# Patient Record
Sex: FEMALE | Race: WHITE
[De-identification: names, ages, dates, MRNs, and addresses within clinical notes are randomized per-mention and may not be internally consistent; named-entity substitution may affect disease eponyms.]

---

## 2020-01-01 ENCOUNTER — RESULT REVIEW (OUTPATIENT)
Age: 85
End: 2020-01-01

## 2020-01-01 ENCOUNTER — APPOINTMENT (OUTPATIENT)
Dept: RADIATION ONCOLOGY | Facility: CLINIC | Age: 85
End: 2020-01-01
Payer: MEDICARE

## 2020-01-01 ENCOUNTER — APPOINTMENT (OUTPATIENT)
Dept: HEMATOLOGY ONCOLOGY | Facility: CLINIC | Age: 85
End: 2020-01-01

## 2020-01-01 ENCOUNTER — APPOINTMENT (OUTPATIENT)
Dept: GASTROENTEROLOGY | Facility: HOSPITAL | Age: 85
End: 2020-01-01

## 2020-01-01 ENCOUNTER — APPOINTMENT (OUTPATIENT)
Dept: HEMATOLOGY ONCOLOGY | Facility: CLINIC | Age: 85
End: 2020-01-01
Payer: MEDICARE

## 2020-01-01 ENCOUNTER — APPOINTMENT (OUTPATIENT)
Dept: GASTROENTEROLOGY | Facility: CLINIC | Age: 85
End: 2020-01-01
Payer: MEDICARE

## 2020-01-01 VITALS
RESPIRATION RATE: 15 BRPM | DIASTOLIC BLOOD PRESSURE: 57 MMHG | TEMPERATURE: 97 F | WEIGHT: 89 LBS | HEART RATE: 80 BPM | SYSTOLIC BLOOD PRESSURE: 115 MMHG | OXYGEN SATURATION: 96 % | HEIGHT: 59 IN | BODY MASS INDEX: 17.94 KG/M2

## 2020-01-01 VITALS
HEART RATE: 80 BPM | BODY MASS INDEX: 18.83 KG/M2 | SYSTOLIC BLOOD PRESSURE: 120 MMHG | DIASTOLIC BLOOD PRESSURE: 59 MMHG | WEIGHT: 93.38 LBS | TEMPERATURE: 97.3 F | RESPIRATION RATE: 18 BRPM | HEIGHT: 59 IN | OXYGEN SATURATION: 96 %

## 2020-01-01 VITALS
HEART RATE: 80 BPM | OXYGEN SATURATION: 97 % | DIASTOLIC BLOOD PRESSURE: 76 MMHG | RESPIRATION RATE: 14 BRPM | TEMPERATURE: 97 F | SYSTOLIC BLOOD PRESSURE: 136 MMHG | HEIGHT: 59 IN | BODY MASS INDEX: 18.35 KG/M2 | WEIGHT: 91 LBS

## 2020-01-01 VITALS
HEART RATE: 80 BPM | TEMPERATURE: 97.4 F | OXYGEN SATURATION: 95 % | BODY MASS INDEX: 16.93 KG/M2 | DIASTOLIC BLOOD PRESSURE: 82 MMHG | WEIGHT: 84 LBS | HEIGHT: 59 IN | RESPIRATION RATE: 18 BRPM | SYSTOLIC BLOOD PRESSURE: 142 MMHG

## 2020-01-01 VITALS
WEIGHT: 92.19 LBS | HEIGHT: 59 IN | BODY MASS INDEX: 18.59 KG/M2 | SYSTOLIC BLOOD PRESSURE: 116 MMHG | TEMPERATURE: 97.4 F | OXYGEN SATURATION: 96 % | RESPIRATION RATE: 18 BRPM | DIASTOLIC BLOOD PRESSURE: 68 MMHG | HEART RATE: 80 BPM

## 2020-01-01 VITALS
HEART RATE: 62 BPM | WEIGHT: 84 LBS | DIASTOLIC BLOOD PRESSURE: 78 MMHG | SYSTOLIC BLOOD PRESSURE: 110 MMHG | OXYGEN SATURATION: 96 % | TEMPERATURE: 96.3 F | HEIGHT: 59 IN | BODY MASS INDEX: 16.93 KG/M2

## 2020-01-01 VITALS
BODY MASS INDEX: 17.94 KG/M2 | SYSTOLIC BLOOD PRESSURE: 124 MMHG | TEMPERATURE: 98 F | DIASTOLIC BLOOD PRESSURE: 74 MMHG | HEIGHT: 59 IN | WEIGHT: 89 LBS | OXYGEN SATURATION: 98 % | RESPIRATION RATE: 12 BRPM | HEART RATE: 78 BPM

## 2020-01-01 DIAGNOSIS — E86.0 DEHYDRATION: ICD-10-CM

## 2020-01-01 DIAGNOSIS — Z87.891 PERSONAL HISTORY OF NICOTINE DEPENDENCE: ICD-10-CM

## 2020-01-01 DIAGNOSIS — Z82.49 FAMILY HISTORY OF ISCHEMIC HEART DISEASE AND OTHER DISEASES OF THE CIRCULATORY SYSTEM: ICD-10-CM

## 2020-01-01 DIAGNOSIS — N28.9 DISORDER OF KIDNEY AND URETER, UNSPECIFIED: ICD-10-CM

## 2020-01-01 DIAGNOSIS — R13.10 DYSPHAGIA, UNSPECIFIED: ICD-10-CM

## 2020-01-01 DIAGNOSIS — Z87.448 PERSONAL HISTORY OF OTHER DISEASES OF URINARY SYSTEM: ICD-10-CM

## 2020-01-01 DIAGNOSIS — Z95.0 PRESENCE OF CARDIAC PACEMAKER: ICD-10-CM

## 2020-01-01 DIAGNOSIS — R63.4 ABNORMAL WEIGHT LOSS: ICD-10-CM

## 2020-01-01 DIAGNOSIS — Z78.9 OTHER SPECIFIED HEALTH STATUS: ICD-10-CM

## 2020-01-01 DIAGNOSIS — Z85.820 PERSONAL HISTORY OF MALIGNANT MELANOMA OF SKIN: ICD-10-CM

## 2020-01-01 DIAGNOSIS — C15.9 MALIGNANT NEOPLASM OF ESOPHAGUS, UNSPECIFIED: ICD-10-CM

## 2020-01-01 DIAGNOSIS — Z87.42 PERSONAL HISTORY OF OTHER DISEASES OF THE FEMALE GENITAL TRACT: ICD-10-CM

## 2020-01-01 DIAGNOSIS — Z86.39 PERSONAL HISTORY OF OTHER ENDOCRINE, NUTRITIONAL AND METABOLIC DISEASE: ICD-10-CM

## 2020-01-01 DIAGNOSIS — B02.23 POSTHERPETIC POLYNEUROPATHY: ICD-10-CM

## 2020-01-01 DIAGNOSIS — Z63.4 DISAPPEARANCE AND DEATH OF FAMILY MEMBER: ICD-10-CM

## 2020-01-01 LAB
25(OH)D3 SERPL-MCNC: 60.8 NG/ML
ANA PAT FLD IF-IMP: ABNORMAL
ANA SER IF-ACNC: ABNORMAL
CEA SERPL-MCNC: 4.5 NG/ML
DEPRECATED KAPPA LC FREE/LAMBDA SER: 1.91 RATIO
ERYTHROCYTE [SEDIMENTATION RATE] IN BLOOD BY WESTERGREN METHOD: 55 MM/HR
FERRITIN SERPL-MCNC: 456 NG/ML
FOLATE SERPL-MCNC: >20 NG/ML
IGA SER QL IEP: 462 MG/DL
IGG SER QL IEP: 802 MG/DL
IGM SER QL IEP: 63 MG/DL
INR PPP: 0.93 RATIO
IRON SATN MFR SERPL: 18 %
IRON SERPL-MCNC: 49 UG/DL
KAPPA LC CSF-MCNC: 3.48 MG/DL
KAPPA LC SERPL-MCNC: 6.66 MG/DL
LDH SERPL-CCNC: 267 U/L
M PROTEIN SPEC IFE-MCNC: NORMAL
METHYLMALONATE SERPL-SCNC: 553 NMOL/L
PT BLD: 11 SEC
TIBC SERPL-MCNC: 276 UG/DL
UIBC SERPL-MCNC: 227 UG/DL
VIT B12 SERPL-MCNC: 872 PG/ML

## 2020-01-01 PROCEDURE — 99205 OFFICE O/P NEW HI 60 MIN: CPT

## 2020-01-01 PROCEDURE — 99214 OFFICE O/P EST MOD 30 MIN: CPT

## 2020-01-01 PROCEDURE — 99205 OFFICE O/P NEW HI 60 MIN: CPT | Mod: 25

## 2020-01-01 PROCEDURE — 99214 OFFICE O/P EST MOD 30 MIN: CPT | Mod: 25

## 2020-01-01 PROCEDURE — 99213 OFFICE O/P EST LOW 20 MIN: CPT

## 2020-01-01 PROCEDURE — 99072 ADDL SUPL MATRL&STAF TM PHE: CPT

## 2020-01-01 PROCEDURE — 99204 OFFICE O/P NEW MOD 45 MIN: CPT

## 2020-01-01 RX ORDER — FUROSEMIDE 40 MG/1
40 TABLET ORAL
Refills: 0 | Status: ACTIVE | COMMUNITY

## 2020-01-01 RX ORDER — METOPROLOL TARTRATE 50 MG/1
50 TABLET, FILM COATED ORAL
Refills: 0 | Status: ACTIVE | COMMUNITY

## 2020-01-01 RX ORDER — MULTIVITAMIN
CAPSULE ORAL
Refills: 0 | Status: ACTIVE | COMMUNITY

## 2020-01-01 RX ORDER — ELECTROLYTES/DEXTROSE
SOLUTION, ORAL ORAL
Refills: 0 | Status: ACTIVE | COMMUNITY

## 2020-01-01 RX ORDER — LEVOTHYROXINE SODIUM 100 UG/1
100 TABLET ORAL
Refills: 0 | Status: ACTIVE | COMMUNITY

## 2020-01-01 RX ORDER — ISOSORBIDE DINITRATE 5 MG/1
TABLET ORAL
Refills: 0 | Status: ACTIVE | COMMUNITY

## 2020-01-01 RX ORDER — LOSARTAN POTASSIUM 25 MG/1
25 TABLET, FILM COATED ORAL
Refills: 0 | Status: ACTIVE | COMMUNITY

## 2020-01-01 SDOH — SOCIAL STABILITY - SOCIAL INSECURITY: DISSAPEARANCE AND DEATH OF FAMILY MEMBER: Z63.4

## 2020-09-30 PROBLEM — Z00.00 ENCOUNTER FOR PREVENTIVE HEALTH EXAMINATION: Status: ACTIVE | Noted: 2020-01-01

## 2020-10-01 PROBLEM — Z87.891 FORMER CIGARETTE SMOKER: Status: ACTIVE | Noted: 2020-01-01

## 2020-10-01 PROBLEM — Z78.9 NO FAMILY HISTORY OF CANCER: Status: ACTIVE | Noted: 2020-01-01

## 2020-10-01 PROBLEM — Z87.448 HISTORY OF CHRONIC KIDNEY DISEASE: Status: RESOLVED | Noted: 2020-01-01 | Resolved: 2020-01-01

## 2020-10-01 PROBLEM — Z86.39 HISTORY OF HYPOTHYROIDISM: Status: RESOLVED | Noted: 2020-01-01 | Resolved: 2020-01-01

## 2020-10-01 PROBLEM — Z82.49 FAMILY HISTORY OF BLOOD CLOTS: Status: ACTIVE | Noted: 2020-01-01

## 2020-10-01 PROBLEM — B02.23 POST-HERPETIC POLYNEUROPATHY: Status: RESOLVED | Noted: 2020-01-01 | Resolved: 2020-01-01

## 2020-10-01 PROBLEM — Z87.42 HISTORY OF UTERINE PROLAPSE: Status: RESOLVED | Noted: 2020-01-01 | Resolved: 2020-01-01

## 2020-10-01 PROBLEM — Z95.0 HISTORY OF CARDIAC PACEMAKER: Status: RESOLVED | Noted: 2020-01-01 | Resolved: 2020-01-01

## 2020-10-01 PROBLEM — Z78.9 ALCOHOL INGESTION: Status: ACTIVE | Noted: 2020-01-01

## 2020-10-01 PROBLEM — Z85.820 HISTORY OF MALIGNANT MELANOMA: Status: RESOLVED | Noted: 2020-01-01 | Resolved: 2020-01-01

## 2020-10-08 PROBLEM — Z63.4 WIDOW: Status: ACTIVE | Noted: 2020-01-01

## 2020-10-08 NOTE — HISTORY OF PRESENT ILLNESS
[FreeTextEntry1] : 89 yo female referred by Dr. Hernandez for esophageal adenocarcinoma.\par pt began to experience dysphagia about 3-4 months ago and went to GI for evaluation. \par 8/18/2020 Esophagram ( [facility]) revealed a short segment of significant narrowing of the distal esophagus with an irregular contour suspicious for malignancy. Dr. Treviño (GI) \par 9/11/2020 EGD ([facility]) revealed a circumferential mass measuring 1.5 x 13 cm in size was found mid esophagus and distal esophagus, multiple biopsies performed. This tumor appears amenable to metallic stenting. 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 \par pathology revealed invasive adenocarcinoma , moderately differentiated. \[facility] pt saw Dr. Hernandez on 10/1/2020  CT  Scan on 10/9/2020 PET/Ct on 10/14/2020 and then sees Dr. Hernandez on 10/16/2020. \dyan pt is feeling ok. She does feel some RUQ pain, 7/10 PS. takes Tylenol Q6H, She is unable to eat foods that she needs to chew. She is able to eat soft foods, protein shakes, and some water. Weight is down to 91 #, She lost about 10# in 3 months. Denies nausea/ vomiting. She has constipation since not eating much but takes Colace on occasion. She has some fatigue but usually sits around the house most of the day. \par Patient denies history of cough or shortness of breath.  No history of hoarseness of voice.

## 2020-10-08 NOTE — PHYSICAL EXAM
[Thin] : thin [Normal] : oriented to person, place and time, the affect was normal, the mood was normal and not anxious [de-identified] : No mucosal lesions noted in oral cavity or oropharynx. [de-identified] : No neck nodes.  No supraclavicular nodes

## 2020-10-08 NOTE — VITALS
[Maximal Pain Intensity: 7/10] : 7/10 [OTC] : OTC [50: Requires considerable assistance and frequent medical care.] : 50: Requires considerable assistance and frequent medical care. [ECOG Performance Status: 3 - Capable of only limited self care, confined to bed or chair more than 50% of waking hours] : Performance Status: 3 - Capable of only limited self care, confined to bed or chair more than 50% of waking hours

## 2020-10-16 NOTE — REVIEW OF SYSTEMS
[Constipation: Grade 0] : Constipation: Grade 0 [Diarrhea: Grade 0] : Diarrhea: Grade 0 [Dyspepsia: Grade 0] : Dyspepsia: Grade 0 [Dysphagia: Grade 2 - Symptomatic and altered eating/swallowing] : Dysphagia: Grade 2 - Symptomatic and altered eating/swallowing [Nausea: Grade 0] : Nausea: Grade 0 [Vomiting: Grade 0] : Vomiting: Grade 0 [Fatigue: Grade 1 - Fatigue relieved by rest] : Fatigue: Grade 1 - Fatigue relieved by rest [Blurred Vision: Grade 0] : Blurred Vision: Grade 0 [Tinnitus - Grade 0] : Tinnitus - Grade 0 [Xerostomia: Grade 2 - Moderate symptoms; oral intake alterations (e.g., copious water, other lubricants, diet limited to purees and/or soft, moist foods); unstimulated saliva 0.1 to 0.2 ml/min] : Xerostomia: Grade 2 - Moderate symptoms; oral intake alterations (e.g., copious water, other lubricants, diet limited to purees and/or soft, moist foods); unstimulated saliva 0.1 to 0.2 ml/min [Mucositis Oral: Grade 0] : Mucositis Oral: Grade 0  [Oral Pain: Grade 0] : Oral Pain: Grade 0 [Salivary duct inflammation: Grade 0] : Salivary duct inflammation: Grade 0 [Dysgeusia: Grade 2 - Altered taste with change in diet (e.g., oral supplements); noxious or unpleasant taste; loss of taste] : Dysgeusia: Grade 2 - Altered taste with change in diet (e.g., oral supplements); noxious or unpleasant taste; loss of taste [Pruritus: Grade 0] : Pruritus: Grade 0 [Skin Hyperpigmentation: Grade 0] : Skin Hyperpigmentation: Grade 0

## 2020-10-16 NOTE — VITALS
[Maximal Pain Intensity: 0/10] : 0/10 [Least Pain Intensity: 0/10] : 0/10 [Pain Description/Quality: ___] : Pain description/quality: [unfilled] [Pain Duration: ___] : Pain duration: [unfilled] [Pain Location: ___] : Pain Location: [unfilled] [OTC] : OTC [60: Requires occasional assistance, but is able to care for most of his/her needs] : 60: Requires occasional assistance, but is able to care for most of his/her needs [60: Up and around, but minimal active play; keeps busy with quieter activities.] : 60: Up and around, but minimal active play; keeps busy with quieter activities. [ECOG Performance Status: 3 - Capable of only limited self care, confined to bed or chair more than 50% of waking hours] : Performance Status: 3 - Capable of only limited self care, confined to bed or chair more than 50% of waking hours

## 2020-10-16 NOTE — PHYSICAL EXAM
[General Appearance - Alert] : alert [General Appearance - In No Acute Distress] : in no acute distress [Thin] : thin [Normal] : oriented to person, place and time, the affect was normal, the mood was normal and not anxious

## 2020-10-16 NOTE — HISTORY OF PRESENT ILLNESS
[FreeTextEntry1] : Ms. 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 She was last seen in consult here on 10/8/2020. \par \par The impression and plan from that visit was as follows:\par We had a long discussion with the patient and her daughter who accompanied her regarding management. We indicated that she needs to be properly staged. Making therapeutic decisions. Patient will be having CT scan of the chest this week and is scheduled for PET imaging next week. Patient is able to eat soft food without difficulty at this time. We do not see the need for PEG tube placement at this time. Given the length of the tumor that is 13 cm in length, we do not think stent would be useful. We did indicate that radiation therapy can achieve good palliation and quality of life in terms of relieving symptoms of dysphagia and abdominal pain. Given her cardiac and renal abnormalities, she may not be a candidate for systemic chemotherapy. We indicated to them that short course of radiation therapy directed for palliation of symptoms could be a good option for her. We briefly discussed the technical details of radiation therapy including the need for simulation, localization, treatment planning, verification and delivery. Discussed the expected short-term side effects including redness of skin cough, sore throat and abdominal discomfort as well as fatigue. We discussed the long-term side effects including skin and subcutaneous tissue fibrosis, pulmonary fibrosis, esophageal stricture as well as cardiac toxicity. We indicated to them that the quality of life would be our priority in her management. We spent 60 minutes with the consultation of which 45 minutes was spent in discussing the management issues. All their questions and concerns were addressed. We will schedule a visit with us in 1 week following her imaging findings. We discussed her management with her oncologist Dr. Hernandez who is in agreement. \par \par She had a CT scan on 10/9/2020 of the chest, abdomen, and pelvis which revealed apparent thickening of the distal esophagus could reflect reported history of esophageal neoplasm. There is no significant mediastinal adenopathy within limits of this noncontrast enhanced examination. There are few nonspecific pulmonary micronodular densities measuring up to 3mm. No evidence for metastatic disease in the abdomen/pelvis. Thoracic and abdominal aortic enlargement present. She presents today for follow up. \par \par PET scan is still pending. She is going for that this afternoon at [facility].\par \par She presents today she is taking in a liquid diet only, consisting of things such as Ensure, Boost, soups, jello, yogurt, pudding, ice cream. She has dysphagia even with the liquid diet. She complains of pain in her right rib area radiating her her side, she is unable to rate it, but she takes Tylenol for it. She has a history shingles 5 years ago per patient. She complains of dysgeusia and xerostomia. She denies any n/v/d. She lives with her son, she spends most of the day sitting but with assistance she gets up one flight of stairs and to the bathroom. Dr. Hernandez's office was scheduling hydration for her per the daughter. \par

## 2020-10-18 NOTE — PHYSICAL EXAM
[Capable of only limited self care, confined to bed or chair more than 50% of waking hours] : Status 3- Capable of only limited self care, confined to bed or chair more than 50% of waking hours [Normal] : affect appropriate [de-identified] : appears elderly and chronically ill.

## 2020-10-18 NOTE — REVIEW OF SYSTEMS
[Recent Change In Weight] : ~T recent weight change [Constipation] : constipation [Dysphagia] : dysphagia [Diarrhea] : diarrhea [Negative] : Allergic/Immunologic

## 2020-10-18 NOTE — HISTORY OF PRESENT ILLNESS
[de-identified] : Mrs. Downs is a 89 year old female who presents for initial consultation for esophageal adenocarcinoma. \par  She began to experience dysphagia 3-4 months ago and was referred to GI. BArium swallow demonstrated significant narrowing of the distal esophagus with an irregular contour suspicious for malignancy.  Biopsy of the mass on 9/15/20 confirmed a moderately differentiated adenocarcinoma. She reports being unable to tolerate food except for liquids. She has lost approximately ten pounds.  [de-identified] : Her son reports that she is having increasing difficulties with swallowing and has had poor p.o. intake.  Otherwise, she offers no additional new complaints. [0 - No Distress] : Distress Level: 0

## 2020-10-18 NOTE — REASON FOR VISIT
[Initial Consultation] : an initial consultation [Family Member] : family member [FreeTextEntry2] : Esophageal  Adenocarcinoma

## 2020-10-18 NOTE — ASSESSMENT
[FreeTextEntry1] : This is a very pleasant 89-year-old woman who has recently been found to have a mid esophageal adenocarcinoma.  By endoscopic report it is reported to be a circumferential 1.5 x 13 cm lesion.  at this time, there are no available staging scans to me.  I will obtain a complete staging workup as this may have a significant impact on what is offered therapeutically to her. This will include a CAT scan of the chest, abdomen, and pelvis as well as a PET/CT scan to rule out occult metastatic disease.  These have not yet been completed.  I have also left a message for her gastroenterologist to discuss her care and awaiting a call back.  The patient's son has stated that they had discussed the possibility of an esophageal stent, however, given the length of the tumor I suspect that may not be feasible.  I am also referring her to radiation therapy as she will need palliative radiation due to her worsening dysphagia.  She may also need a feeding tube and this will be discussed with the gastroenterologist.  given her age and overall poor performance status I am hesitant to offer her systemic chemotherapy along with radiation therapy.  Should she have more extensive disease when staging scans are available then we will consider further systemic options.  I am requesting molecular, PDL-1 and MMR/MSI-H testing on her pathology sample.  her son reports that her dysphagia has worsened and she is taking less fluids. I will make arrangements for her to receive IV fluids in the infusion center.  I have also contacted her nephrologist to obtain a better understanding of her renal function as her initial creatinine was 1.8.\par She will return in one week for followup and to hopefully review the above mentioned workup.\par \par Thank you very much for allowing me to participate in this woman's medical care. Should you have any questions or concerns please do not hesitate to call me directly.

## 2020-10-18 NOTE — HISTORY OF PRESENT ILLNESS
[de-identified] : Mrs. Downs is a 89 year old female who presents for initial consultation for esophageal adenocarcinoma. \par  She began to experience dysphagia 3-4 months ago and was referred to GI. BArium swallow demonstrated significant narrowing of the distal esophagus with an irregular contour suspicious for malignancy.  Biopsy of the mass on 9/15/20 confirmed a moderately differentiated adenocarcinoma. She reports being unable to tolerate food except for liquids. She has lost approximately ten pounds.  [0 - No Distress] : Distress Level: 0

## 2020-10-18 NOTE — REASON FOR VISIT
[Follow-Up Visit] : a follow-up [Family Member] : family member [FreeTextEntry2] : Esophageal  Adenocarcinoma

## 2020-10-18 NOTE — PHYSICAL EXAM
[Capable of only limited self care, confined to bed or chair more than 50% of waking hours] : Status 3- Capable of only limited self care, confined to bed or chair more than 50% of waking hours [Normal] : affect appropriate [de-identified] : appears elderly and chronically ill.

## 2020-10-18 NOTE — ASSESSMENT
[FreeTextEntry1] : This is a very pleasant 89-year-old woman who has recently been found to have a mid esophageal adenocarcinoma.  By endoscopic report it is reported to be a circumferential 1.5 x 13 cm lesion.  at this time, there are noavailable staging scans to me.  I will obtain a complete staging workup as this may have a significant impact on what is offered therapeutically to her. This will include a CAT scan of the chest, abdomen, and pelvis as well as a PET/CT scan to rule out occult metastatic disease.  I have also left a message for her gastroenterologist to discuss her care.  The patient's son has stated that they had discussed the possibility of an esophageal stent, however, given the length of the tumor I suspect that may not be feasible.  I am also referring her to radiation therapy as she will need palliative radiation due to her worsening dysphagia.  She may also need a feeding tube and this will be discussed with the gastroenterologist.  given her age and overall poor performance status I am hesitant to offer her systemic chemotherapy along with radiation therapy.  Should she have more extensive disease when staging scans are available then we will consider further systemic options.  I am requesting molecular, PDL-1 and MMR/MSI-H testing on her pathology sample.\par She will return in one week for followup and to hopefully review the above mentioned workup.\par \par Thank you very much for allowing me to participate in this woman's medical care. Should you have any questions or concerns please do not hesitate to call me directly.

## 2020-10-18 NOTE — REVIEW OF SYSTEMS
[Recent Change In Weight] : ~T recent weight change [Dysphagia] : dysphagia [Constipation] : constipation [Diarrhea] : diarrhea [Negative] : Allergic/Immunologic

## 2020-10-19 PROBLEM — R13.10 DYSPHAGIA: Status: ACTIVE | Noted: 2020-01-01

## 2020-10-19 PROBLEM — R63.4 WEIGHT LOSS: Status: ACTIVE | Noted: 2020-01-01

## 2020-10-19 PROBLEM — E86.0 DEHYDRATION: Status: ACTIVE | Noted: 2020-01-01

## 2020-10-19 PROBLEM — N28.9 RENAL INSUFFICIENCY: Status: ACTIVE | Noted: 2020-01-01

## 2020-10-19 NOTE — HISTORY OF PRESENT ILLNESS
[Disease: _____________________] : Disease: [unfilled] [T: ___] : T[unfilled] [M: ___] : M[unfilled] [N: ___] : N[unfilled] [de-identified] : Mrs. Downs is a 89 year old female who presents for initial consultation for esophageal adenocarcinoma. \par  She began to experience dysphagia 3-4 months ago and was referred to GI. BArium swallow demonstrated significant narrowing of the distal esophagus with an irregular contour suspicious for malignancy.  Biopsy of the mass on 9/15/20 confirmed a moderately differentiated adenocarcinoma. She reports being unable to tolerate food except for liquids. She has lost approximately ten pounds.  [de-identified] : Adenocarcinoma [de-identified] : She presents for follow up of esophageal cancer.  She has lost an additional 4 lbs. because she is unable to swallow solid and has some difficulty with liquids. She underwent CT of C/A/P on 10/9/20  which showed thickening of the distal esophagus but no mediastinal adenopathy. She was scheduled for a PET/CT last Wed but had applesauce and was rescheduled for 10/16/20.  This too did not show evidence of distant disease or mediastinal hypermetabolic adenopathy.  She has received hydration.We have been trying to contact her GI about stent or evaluation for feeding tube placement.

## 2020-10-19 NOTE — ASSESSMENT
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 There were also a few, pulmonary nonspecific micronodular densities measuring up to 3 mm, otherwise there was no evidence for metastatic disease.  She will undergo PET/CT tomorrow. We discussed follow up with GI or surgery for palliation of her dysphagia as she has lost approximately 15 pounds and is experiencing difficulty with liquids. It was explained that it may not be feasible to place a stent since the length of the tumor is 13 cm.  Alternatively she could consider a feeding tube placed either endoscopically or surgically. We are having difficulty reaching her GI and will refer her accordingly if we do not hear from him. In the meantime we have offered her hydration and she will continue with frequent, liquid meals. She consulted with radiation oncology who recommended radiation for palliation.  The son has requested that all her care be switched to local physicians.I have therefore contacted Dr. Easton who will perform an endoscopic ultrasound and deploy an esophageal stent if feasible. His office will reach out to the patient and son to make arrangements for this.\par She is taking Tylenol for analgesia.  She was advised to try [facility] Liquid Tylenol. \par Reviewed labs which were normal except for renal insufficiency. \par Office visit in 1 week or prn for new or worsening symptoms.

## 2020-10-19 NOTE — REVIEW OF SYSTEMS
[Fatigue] : fatigue [Recent Change In Weight] : ~T recent weight change [Dysphagia] : dysphagia [Negative] : Allergic/Immunologic [FreeTextEntry2] : another 4 lbs.

## 2020-10-20 NOTE — HISTORY OF PRESENT ILLNESS
[Disease: _____________________] : Disease: [unfilled] [T: ___] : T[unfilled] [N: ___] : N[unfilled] [M: ___] : M[unfilled] [de-identified] : Mrs. Downs is a 89 year old female who presents for initial consultation for esophageal adenocarcinoma. \par  She began to experience dysphagia 3-4 months ago and was referred to GI. BArium swallow demonstrated significant narrowing of the distal esophagus with an irregular contour suspicious for malignancy.  Biopsy of the mass on 9/15/20 confirmed a moderately differentiated adenocarcinoma. She reports being unable to tolerate food except for liquids. She has lost approximately ten pounds.  [de-identified] : Adenocarcinoma [de-identified] : She presents for follow up of esophageal cancer.  She has lost an additional 4 lbs. because she is unable to swallow solid and has some difficulty with liquids. She underwent CT of C/A/P on 10/9/20  which showed thickening of the distal esophagus but no mediastinal adenopathy. She was scheduled for a PET/CT yesterday but had applesauce and is scheduled tomorrow. She has received hydration.We have been trying to contact her GI about stent or evaluation for

## 2020-10-20 NOTE — ASSESSMENT
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 There were also a few, pulmonary nonspecific micronodular densities measuring up to 3 mm, otherwise there was no evidence for metastatic disease.  She will undergo PET/CT tomorrow. We discussed follow up with GI or surgery for palliation of her dysphagia as she has lost approximately 15 pounds and is experiencing difficulty with liquids. It was explained that it may not be feasible to place a stent since the length of the tumor is 13 cm.  Alternatively she could consider a feeding tube placed either endoscopically or surgically. We are having difficulty reaching her GI and will refer her accordingly if we do not hear from him. In the meantime we have offered her hydration and she will continue with frequent, liquid meals. She consulted with radiation oncology who recommended radiation for palliation.  We will continue this discussion after her PET/CT is available.  She will receive hydration tomorrow. She is taking Tylenol for analgesia.  She was advised to try [facility] Liquid Tylenol. \par Reviewed labs which were normal except for renal insufficiency. \par History of present illness, review of systems, physical exam and treatment plan reviewed with .\par Office visit in 1 week or prn for new or worsening symptoms.

## 2020-10-22 PROBLEM — C15.9 ADENOCARCINOMA OF ESOPHAGUS: Status: ACTIVE | Noted: 2020-01-01

## 2020-10-22 NOTE — HISTORY OF PRESENT ILLNESS
[FreeTextEntry1] : Ms. Shipman is a pleasant 89F h/o PPM, hypothyroid, CKD, cholecystectomy, melanoma who is seen at the request of Dr. Hernandez for evaluation of a recently diagnosed esophageal adenocarcinoma.  She was seen by a GI physician in September for evaluation of 3-4 months of dysphagia, had an EGD on 9/15/20 which confirmed an esophageal adenocarcinoma.\par \par Was seen by Dr. Hernandez initially on 10/1/20, has been undergoing palliative XRT.\par \par Has been experiencing progressively worsening dysphagia, initially had difficulty swallowing solids, now has progressed to liquids as well.  States she has been vomiting all liquids that she has been drinking.  Does not want surgery nor chemotherapy, is seen for a palliative stent placement evaluation.  Goals of care are for eating and general comfort.

## 2020-10-22 NOTE — PHYSICAL EXAM
[FreeTextEntry1] : Cachectic [Sclera] : the sclera and conjunctiva were normal [Outer Ear] : the ears and nose were normal in appearance [Neck Appearance] : the appearance of the neck was normal [] : no respiratory distress [Apical Impulse] : the apical impulse was normal [Abdomen Soft] : soft [Abnormal Walk] : normal gait [Skin Color & Pigmentation] : normal skin color and pigmentation [Cranial Nerves] : cranial nerves 2-12 were intact [Oriented To Time, Place, And Person] : oriented to person, place, and time

## 2020-10-22 NOTE — ASSESSMENT
[FreeTextEntry1] : Will plan on an upper endoscopy with esophageal stent placement for obstructing esophageal adenocarcinoma.  Explained risks/benefits/alternatives including not limited to bleeding, infection, perforation, missed lesions, anesthesia complications.  Patient understands and agrees, all questions answered.\par \par Will likely place a partially covered 18 mm esophageal stent, may need overlapping stent placement depending on the length of her esophageal obstruction/mass.\par \par Explained high likelihood of persistent reflux after stent placement.  Understands and is willing to take this risk.  Will likely place on long term PPI after stent placement.\par \par Thank you for referring Ms. WEEKS.  Please do not hesitate to call to further discuss his/her care.\par \par Note faxed to requesting MD.\par \par

## 2020-10-29 NOTE — HISTORY OF PRESENT ILLNESS
[de-identified] : Mrs. Downs is a 89 year old female who presents for initial consultation for esophageal adenocarcinoma. \par  She began to experience dysphagia 3-4 months ago and was referred to GI. BArium swallow demonstrated significant narrowing of the distal esophagus with an irregular contour suspicious for malignancy.  Biopsy of the mass on 9/15/20 confirmed a moderately differentiated adenocarcinoma. She reports being unable to tolerate food except for liquids. She has lost approximately ten pounds.  [de-identified] : Adenocarcinoma [de-identified] : She presents for follow up of esophageal cancer.  She has lost an additional 4 lbs. because she is unable to swallow solid and has some difficulty with liquids. She underwent CT of C/A/P on 10/9/20  which showed thickening of the distal esophagus but no mediastinal adenopathy. She was scheduled for a PET/CT last Wed but had applesauce and was rescheduled for 10/16/20.  This too did not show evidence of distant disease or mediastinal hypermetabolic adenopathy.  She has received hydration.We have been trying to contact her GI about stent or evaluation for feeding tube placement.

## 2020-11-09 ENCOUNTER — APPOINTMENT (OUTPATIENT)
Dept: HEMATOLOGY ONCOLOGY | Facility: CLINIC | Age: 85
End: 2020-11-09

## 2020-11-16 ENCOUNTER — APPOINTMENT (OUTPATIENT)
Dept: HEMATOLOGY ONCOLOGY | Facility: CLINIC | Age: 85
End: 2020-11-16

## 2020-11-23 ENCOUNTER — APPOINTMENT (OUTPATIENT)
Dept: HEMATOLOGY ONCOLOGY | Facility: CLINIC | Age: 85
End: 2020-11-23

## 2020-11-23 DIAGNOSIS — E55.9 VITAMIN D DEFICIENCY, UNSPECIFIED: ICD-10-CM

## 2020-11-23 DIAGNOSIS — Z79.01 LONG TERM (CURRENT) USE OF ANTICOAGULANTS: ICD-10-CM
